# Patient Record
Sex: FEMALE | Race: WHITE | URBAN - METROPOLITAN AREA
[De-identification: names, ages, dates, MRNs, and addresses within clinical notes are randomized per-mention and may not be internally consistent; named-entity substitution may affect disease eponyms.]

---

## 2020-07-14 ENCOUNTER — TELEPHONE ENCOUNTER (OUTPATIENT)
Dept: URBAN - METROPOLITAN AREA CLINIC 92 | Facility: CLINIC | Age: 50
End: 2020-07-14

## 2020-07-14 RX ORDER — PREDNISONE 5 MG/1
AS DIRECTED TABLET ORAL AS DIRECTED
Qty: 1 | Refills: 1 | OUTPATIENT

## 2022-09-01 ENCOUNTER — TELEPHONE ENCOUNTER (OUTPATIENT)
Dept: URBAN - METROPOLITAN AREA CLINIC 98 | Facility: CLINIC | Age: 52
End: 2022-09-01

## 2022-09-01 RX ORDER — METHYLPREDNISOLONE 4 MG/1
AS DIRECTED TABLET ORAL ONCE A DAY
Qty: 21 TABLETS | Refills: 0 | OUTPATIENT
Start: 2022-09-01 | End: 2022-09-07

## 2022-09-07 ENCOUNTER — TELEPHONE ENCOUNTER (OUTPATIENT)
Dept: URBAN - METROPOLITAN AREA CLINIC 98 | Facility: CLINIC | Age: 52
End: 2022-09-07

## 2022-09-07 ENCOUNTER — CLAIMS CREATED FROM THE CLAIM WINDOW (OUTPATIENT)
Dept: URBAN - METROPOLITAN AREA CLINIC 98 | Facility: CLINIC | Age: 52
End: 2022-09-07
Payer: COMMERCIAL

## 2022-09-07 ENCOUNTER — WEB ENCOUNTER (OUTPATIENT)
Dept: URBAN - METROPOLITAN AREA CLINIC 98 | Facility: CLINIC | Age: 52
End: 2022-09-07

## 2022-09-07 VITALS
WEIGHT: 127 LBS | HEIGHT: 64 IN | DIASTOLIC BLOOD PRESSURE: 83 MMHG | TEMPERATURE: 97.1 F | SYSTOLIC BLOOD PRESSURE: 122 MMHG | BODY MASS INDEX: 21.68 KG/M2 | HEART RATE: 94 BPM

## 2022-09-07 DIAGNOSIS — R15.2 FECAL URGENCY: ICD-10-CM

## 2022-09-07 DIAGNOSIS — K51.80 CHRONIC PANCOLONIC ULCERATIVE COLITIS: ICD-10-CM

## 2022-09-07 DIAGNOSIS — C50.911 MALIGNANT NEOPLASM OF RIGHT FEMALE BREAST, UNSPECIFIED ESTROGEN RECEPTOR STATUS, UNSPECIFIED SITE OF BREAST: ICD-10-CM

## 2022-09-07 DIAGNOSIS — C50.919 BREAST CANCER: ICD-10-CM

## 2022-09-07 DIAGNOSIS — K51.90 ULCERATIVE COLITIS: ICD-10-CM

## 2022-09-07 DIAGNOSIS — K62.5 RECTAL BLEEDING: ICD-10-CM

## 2022-09-07 DIAGNOSIS — K51.00 ULCERATIVE COLITIS: ICD-10-CM

## 2022-09-07 PROBLEM — 372064008: Status: ACTIVE | Noted: 2022-09-07

## 2022-09-07 PROCEDURE — 99215 OFFICE O/P EST HI 40 MIN: CPT | Performed by: INTERNAL MEDICINE

## 2022-09-07 RX ORDER — MESALAMINE 4 G/60ML
AS DIRECTED SUSPENSION RECTAL
Qty: 90 EACH | Refills: 3 | OUTPATIENT
Start: 2022-09-07 | End: 2023-09-02

## 2022-09-07 RX ORDER — LEVOTHYROXINE SODIUM 75 UG/1
TAKE 1 TABLET (75 MCG) BY ORAL ROUTE ONCE DAILY TABLET ORAL 1
Qty: 0 | Refills: 0 | Status: ACTIVE | COMMUNITY
Start: 1900-01-01

## 2022-09-07 NOTE — HPI-TODAY'S VISIT:
52 yo female with UC dx 1989 or so comes in for near 3 year f/u. Last ov was 10/23/19. 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  Was in remission at colonoscopy 2020. Diet sensitive and responsive.  Never been on biologic  Never on IMM.  Dr. Montaño took care of her for 20 years after Dr. Hill.  Syx worsened December 2022 and she feels it is beer and food related. Did the whole - elimination diet. No gluten No alcohol. Similar to fodmap.  Recent medrol dose pack because syx worst past month or so.  Stools 2-3-4 stools a day. May pass blood once or twice a day.  In June, she did the whole 30 and things got better. Today, the medrol did not help.  Rowasa enemas work/help. No recent Rowasa- 1 week and taper usually helps.

## 2022-09-10 LAB
A/G RATIO: 1.7
ALBUMIN: 4.7
ALKALINE PHOSPHATASE: 78
ALT (SGPT): 15
AST (SGOT): 15
BASO (ABSOLUTE): 0.1
BASOS: 1
BILIRUBIN, TOTAL: 0.3
BUN/CREATININE RATIO: 20
BUN: 16
C-REACTIVE PROTEIN, QUANT: <1
CALCIUM: 9.6
CARBON DIOXIDE, TOTAL: 22
CHLORIDE: 101
CREATININE: 0.82
EGFR: 87
EOS (ABSOLUTE): 0.1
EOS: 1
FERRITIN, SERUM: 19
FOLATE (FOLIC ACID), SERUM: 14.9
GLOBULIN, TOTAL: 2.7
GLUCOSE: 82
HBSAG SCREEN: NEGATIVE
HEMATOCRIT: 44.3
HEMATOLOGY COMMENTS:: (no result)
HEMOGLOBIN: 13.8
HEP B SURFACE AB, QUAL: NON REACTIVE
IMMATURE CELLS: (no result)
IMMATURE GRANS (ABS): 0.1
IMMATURE GRANULOCYTES: 1
IRON BIND.CAP.(TIBC): 376
IRON SATURATION: 22
IRON: 84
LYMPHS (ABSOLUTE): 2.6
LYMPHS: 27
MCH: 26.7
MCHC: 31.2
MCV: 86
MONOCYTES(ABSOLUTE): 0.6
MONOCYTES: 6
NEUTROPHILS (ABSOLUTE): 6.3
NEUTROPHILS: 64
NRBC: (no result)
PLATELETS: 381
POTASSIUM: 4.6
PROTEIN, TOTAL: 7.4
QUANTIFERON CRITERIA: (no result)
QUANTIFERON INCUBATION: (no result)
QUANTIFERON MITOGEN VALUE: 8.45
QUANTIFERON NIL VALUE: 0.02
QUANTIFERON TB1 AG VALUE: 0.01
QUANTIFERON TB2 AG VALUE: 0.02
QUANTIFERON-TB GOLD PLUS: NEGATIVE
RBC: 5.17
RDW: 13.2
SEDIMENTATION RATE-WESTERGREN: 13
SODIUM: 140
UIBC: 292
VITAMIN B12: 688
VITAMIN D, 25-HYDROXY: 33.9
WBC: 9.8

## 2022-09-12 PROBLEM — 698065002 ACID REFLUX: Status: ACTIVE | Noted: 2022-09-12

## 2022-09-12 PROBLEM — 64766004 ULCERATIVE COLITIS: Status: ACTIVE | Noted: 2022-09-07

## 2022-11-10 ENCOUNTER — WEB ENCOUNTER (OUTPATIENT)
Dept: URBAN - METROPOLITAN AREA SURGERY CENTER 18 | Facility: SURGERY CENTER | Age: 52
End: 2022-11-10

## 2022-11-15 ENCOUNTER — CLAIMS CREATED FROM THE CLAIM WINDOW (OUTPATIENT)
Dept: URBAN - METROPOLITAN AREA SURGERY CENTER 18 | Facility: SURGERY CENTER | Age: 52
End: 2022-11-15
Payer: COMMERCIAL

## 2022-11-15 ENCOUNTER — CLAIMS CREATED FROM THE CLAIM WINDOW (OUTPATIENT)
Dept: URBAN - METROPOLITAN AREA CLINIC 4 | Facility: CLINIC | Age: 52
End: 2022-11-15
Payer: COMMERCIAL

## 2022-11-15 ENCOUNTER — CLAIMS CREATED FROM THE CLAIM WINDOW (OUTPATIENT)
Dept: URBAN - METROPOLITAN AREA SURGERY CENTER 18 | Facility: SURGERY CENTER | Age: 52
End: 2022-11-15

## 2022-11-15 DIAGNOSIS — K21.9 ACID REFLUX: ICD-10-CM

## 2022-11-15 DIAGNOSIS — K31.89 OTHER DISEASES OF STOMACH AND DUODENUM: ICD-10-CM

## 2022-11-15 DIAGNOSIS — K51.00 ACUTE ULCERATIVE PANCOLITIS: ICD-10-CM

## 2022-11-15 PROCEDURE — G8907 PT DOC NO EVENTS ON DISCHARG: HCPCS | Performed by: INTERNAL MEDICINE

## 2022-11-15 PROCEDURE — 88305 TISSUE EXAM BY PATHOLOGIST: CPT | Performed by: PATHOLOGY

## 2022-11-15 PROCEDURE — 45380 COLONOSCOPY AND BIOPSY: CPT | Performed by: INTERNAL MEDICINE

## 2022-11-15 PROCEDURE — 43239 EGD BIOPSY SINGLE/MULTIPLE: CPT | Performed by: INTERNAL MEDICINE

## 2022-11-15 RX ORDER — MESALAMINE 4 G/60ML
AS DIRECTED SUSPENSION RECTAL
Qty: 90 EACH | Refills: 3 | Status: ACTIVE | COMMUNITY
Start: 2022-09-07 | End: 2023-09-02

## 2022-11-15 RX ORDER — LEVOTHYROXINE SODIUM 75 UG/1
TAKE 1 TABLET (75 MCG) BY ORAL ROUTE ONCE DAILY TABLET ORAL 1
Qty: 0 | Refills: 0 | Status: ACTIVE | COMMUNITY
Start: 1900-01-01

## 2023-08-16 ENCOUNTER — OFFICE VISIT (OUTPATIENT)
Dept: URBAN - METROPOLITAN AREA CLINIC 98 | Facility: CLINIC | Age: 53
End: 2023-08-16
Payer: COMMERCIAL

## 2023-08-16 ENCOUNTER — TELEPHONE ENCOUNTER (OUTPATIENT)
Dept: URBAN - METROPOLITAN AREA CLINIC 98 | Facility: CLINIC | Age: 53
End: 2023-08-16

## 2023-08-16 ENCOUNTER — DASHBOARD ENCOUNTERS (OUTPATIENT)
Age: 53
End: 2023-08-16

## 2023-08-16 VITALS
BODY MASS INDEX: 21.31 KG/M2 | HEIGHT: 64 IN | HEART RATE: 85 BPM | SYSTOLIC BLOOD PRESSURE: 127 MMHG | TEMPERATURE: 97.9 F | DIASTOLIC BLOOD PRESSURE: 70 MMHG | WEIGHT: 124.8 LBS

## 2023-08-16 DIAGNOSIS — K62.5 RECTAL BLEEDING: ICD-10-CM

## 2023-08-16 DIAGNOSIS — R19.7 DIARRHEA, UNSPECIFIED TYPE: ICD-10-CM

## 2023-08-16 DIAGNOSIS — C50.911 MALIGNANT NEOPLASM OF RIGHT FEMALE BREAST, UNSPECIFIED ESTROGEN RECEPTOR STATUS, UNSPECIFIED SITE OF BREAST: ICD-10-CM

## 2023-08-16 DIAGNOSIS — K51.90 ULCERATIVE COLITIS: ICD-10-CM

## 2023-08-16 DIAGNOSIS — C50.919 BREAST CANCER: ICD-10-CM

## 2023-08-16 DIAGNOSIS — K21.9 ACID REFLUX: ICD-10-CM

## 2023-08-16 PROCEDURE — 99215 OFFICE O/P EST HI 40 MIN: CPT | Performed by: INTERNAL MEDICINE

## 2023-08-16 RX ORDER — LEVOTHYROXINE SODIUM 75 UG/1
TAKE 1 TABLET (75 MCG) BY ORAL ROUTE ONCE DAILY TABLET ORAL 1
Qty: 0 | Refills: 0 | Status: ACTIVE | COMMUNITY
Start: 1900-01-01

## 2023-08-16 RX ORDER — PREDNISONE 5 MG/1
TAKE 4 TABS DAILY WEEK 1, 3 TABS DAILY WEEK 2, 2 TABS DAILY WEEK 3, THEN 1 TAB DAILY WEEK 4, THEN STOP TABLET ORAL ONCE A DAY
Qty: 70 TABLETS | Refills: 1 | OUTPATIENT
Start: 2023-08-16 | End: 2023-10-15

## 2023-08-16 RX ORDER — PREDNISONE 5 MG/1
TAKE 4 TABS DAILY WEEK 1, 3 TABS DAILY WEEK 2, 2 TABS DAILY WEEK 3, THEN 1 TAB DAILY WEEK 4, THEN STOP TABLET ORAL ONCE A DAY
Qty: 70 TABLETS | Refills: 1
Start: 2023-08-16 | End: 2023-10-15

## 2023-08-16 RX ORDER — MESALAMINE 4 G/60ML
AS DIRECTED SUSPENSION RECTAL
Qty: 90 EACH | Refills: 3 | Status: ACTIVE | COMMUNITY
Start: 2022-09-07 | End: 2023-09-02

## 2023-08-16 RX ORDER — MESALAMINE 1.2 G/1
4 TABLETS WITH A MEAL TABLET, DELAYED RELEASE ORAL ONCE A DAY
Qty: 360 TABLET | Refills: 3 | OUTPATIENT
Start: 2023-08-16 | End: 2024-08-10

## 2023-08-16 RX ORDER — TAMOXIFEN CITRATE 10 MG/1
1 TABLET TABLET ORAL TWICE A DAY
Status: ACTIVE | COMMUNITY

## 2023-08-16 RX ORDER — MESALAMINE 1.2 G/1
4 TABLETS WITH A MEAL TABLET, DELAYED RELEASE ORAL ONCE A DAY
Qty: 360 TABLET | Refills: 3
Start: 2023-08-16 | End: 2024-08-10

## 2023-08-16 NOTE — HPI-TODAY'S VISIT:
51 yo female with UC comes in for 1 year office follow. Syx have been ongoing since colonoscopy.  has had 2 knee replacements, so she has been busy  If she follows a fodmap diet, it helps. chocolate and sugar and beer are triggers. ///o breast cancer 1/18/17.  No pain. Urgency Range of stooling per day is just mucus - once a day. Blood every 2-3 days. When she takes Rowasa enema. Has not use  Never been on Lialda.  Never on a biologic. Had double mastectomy 2012, was on tamoxifen, Dr. Troy Beauchamp.  Also has terrible arthritis.Internitst was Dr. Hayde love now, needs a doc.  No sulfa allergy. She would liike to try steroids.    ======================== 9/7/22  50 yo female with UC dx 1989 or so comes in for near 3 year f/u. Last ov was 10/23/19. 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  Was in remission at colonoscopy 2020. Diet sensitive and responsive.  Never been on biologic  Never on IMM.  Dr. Montaño took care of her for 20 years after Dr. Hill.  Syx worsened December 2022 and she feels it is beer and food related. Did the whole - elimination diet. No gluten No alcohol. Similar to fodmap.  Recent medrol dose pack because syx worst past month or so.  Stools 2-3-4 stools a day. May pass blood once or twice a day.  In June, she did the whole 30 and things got better. Today, the medrol did not help.  Rowasa enemas work/help. No recent Rowasa- 1 week and taper usually helps.

## 2023-08-17 LAB
A/G RATIO: 1.6
ALBUMIN: 4.6
ALKALINE PHOSPHATASE: 74
ALT (SGPT): 12
AST (SGOT): 20
BASO (ABSOLUTE): 0.1
BASOS: 1
BILIRUBIN, TOTAL: 0.3
BUN/CREATININE RATIO: 22
BUN: 17
C-REACTIVE PROTEIN, QUANT: <1
CALCIUM: 10
CARBON DIOXIDE, TOTAL: 26
CHLORIDE: 101
CREATININE: 0.79
EGFR: 90
EOS (ABSOLUTE): 0.1
EOS: 1
FERRITIN, SERUM: 32
FOLATE (FOLIC ACID), SERUM: 17.4
GLOBULIN, TOTAL: 2.9
GLUCOSE: 90
HEMATOCRIT: 40.9
HEMATOLOGY COMMENTS:: (no result)
HEMOGLOBIN: 13.3
IMMATURE CELLS: (no result)
IMMATURE GRANS (ABS): 0
IMMATURE GRANULOCYTES: 0
IRON BIND.CAP.(TIBC): 357
IRON SATURATION: 15
IRON: 53
LYMPHS (ABSOLUTE): 2.9
LYMPHS: 33
MCH: 28.4
MCHC: 32.5
MCV: 87
MONOCYTES(ABSOLUTE): 0.5
MONOCYTES: 6
NEUTROPHILS (ABSOLUTE): 5.3
NEUTROPHILS: 59
NRBC: (no result)
PLATELETS: 335
POTASSIUM: 4.5
PROTEIN, TOTAL: 7.5
RBC: 4.68
RDW: 13.2
SEDIMENTATION RATE-WESTERGREN: 10
SODIUM: 140
UIBC: 304
VITAMIN B12: 642
VITAMIN D, 25-HYDROXY: 42.7
WBC: 8.9

## 2023-08-18 ENCOUNTER — TELEPHONE ENCOUNTER (OUTPATIENT)
Dept: URBAN - METROPOLITAN AREA CLINIC 98 | Facility: CLINIC | Age: 53
End: 2023-08-18

## 2023-08-18 RX ORDER — LEVOTHYROXINE SODIUM 75 UG/1
TAKE 1 TABLET (75 MCG) BY ORAL ROUTE ONCE DAILY TABLET ORAL 1
Qty: 0 | Refills: 0 | Status: ACTIVE | COMMUNITY
Start: 1900-01-01

## 2023-08-18 RX ORDER — MESALAMINE 375 MG/1
4 CAPSULES IN THE MORNING CAPSULE, EXTENDED RELEASE ORAL ONCE A DAY
Qty: 360 CAPSULE | Refills: 3 | OUTPATIENT
Start: 2023-08-18 | End: 2024-08-12

## 2023-08-18 RX ORDER — TAMOXIFEN CITRATE 10 MG/1
1 TABLET TABLET ORAL TWICE A DAY
Status: ACTIVE | COMMUNITY

## 2023-08-18 RX ORDER — MESALAMINE 375 MG/1
4 CAPSULES IN THE MORNING CAPSULE, EXTENDED RELEASE ORAL ONCE A DAY
Qty: 360 CAPSULE | Refills: 3 | Status: ACTIVE | COMMUNITY
Start: 2023-08-18 | End: 2024-08-12

## 2023-08-18 RX ORDER — MESALAMINE 4 G/60ML
AS DIRECTED SUSPENSION RECTAL
Qty: 90 EACH | Refills: 3 | Status: ACTIVE | COMMUNITY
Start: 2022-09-07 | End: 2023-09-02

## 2023-08-18 RX ORDER — MESALAMINE 1.2 G/1
4 TABLETS WITH A MEAL TABLET, DELAYED RELEASE ORAL ONCE A DAY
Qty: 360 TABLET | Refills: 3 | Status: DISCONTINUED | COMMUNITY
Start: 2023-08-16 | End: 2024-08-10

## 2023-08-18 RX ORDER — PREDNISONE 5 MG/1
TAKE 4 TABS DAILY WEEK 1, 3 TABS DAILY WEEK 2, 2 TABS DAILY WEEK 3, THEN 1 TAB DAILY WEEK 4, THEN STOP TABLET ORAL ONCE A DAY
Qty: 70 TABLETS | Refills: 1 | Status: ACTIVE | COMMUNITY
Start: 2023-08-16 | End: 2023-10-15

## 2023-09-06 ENCOUNTER — TELEPHONE ENCOUNTER (OUTPATIENT)
Dept: URBAN - METROPOLITAN AREA CLINIC 98 | Facility: CLINIC | Age: 53
End: 2023-09-06

## 2023-09-06 RX ORDER — PREDNISONE 10 MG/1
4 TABLETS TABLET ORAL ONCE A DAY
Qty: 120 TABLET | Refills: 0 | OUTPATIENT
Start: 2023-09-06 | End: 2023-10-06

## 2024-12-27 ENCOUNTER — WEB ENCOUNTER (OUTPATIENT)
Dept: URBAN - METROPOLITAN AREA CLINIC 98 | Facility: CLINIC | Age: 54
End: 2024-12-27

## 2024-12-30 ENCOUNTER — TELEPHONE ENCOUNTER (OUTPATIENT)
Dept: URBAN - METROPOLITAN AREA CLINIC 98 | Facility: CLINIC | Age: 54
End: 2024-12-30

## 2024-12-30 RX ORDER — MESALAMINE 1.2 G/1
4 TABLETS WITH MEAL TABLET, DELAYED RELEASE ORAL ONCE A DAY
Qty: 360 TABLET | Refills: 3
Start: 2024-12-30 | End: 2025-12-25

## 2024-12-30 RX ORDER — MESALAMINE 1.2 G/1
4 TABLETS WITH MEAL TABLET, DELAYED RELEASE ORAL ONCE A DAY
Qty: 360 TABLET | Refills: 3 | OUTPATIENT
Start: 2024-12-30 | End: 2025-12-25

## 2024-12-30 RX ORDER — TAMOXIFEN CITRATE 10 MG/1
1 TABLET TABLET ORAL TWICE A DAY
Status: DISCONTINUED | COMMUNITY

## 2024-12-30 RX ORDER — MESALAMINE 1.2 G/1
4 TABLETS WITH MEAL TABLET, DELAYED RELEASE ORAL ONCE A DAY
Qty: 360 TABLET | Refills: 3 | Status: ACTIVE | COMMUNITY
Start: 2024-12-30 | End: 2025-12-25

## 2024-12-30 RX ORDER — LEVOTHYROXINE SODIUM 75 UG/1
TAKE 1 TABLET (75 MCG) BY ORAL ROUTE ONCE DAILY TABLET ORAL 1
Qty: 0 | Refills: 0 | Status: ACTIVE | COMMUNITY
Start: 1900-01-01

## 2025-01-01 ENCOUNTER — WEB ENCOUNTER (OUTPATIENT)
Dept: URBAN - METROPOLITAN AREA CLINIC 98 | Facility: CLINIC | Age: 55
End: 2025-01-01

## 2025-01-02 ENCOUNTER — TELEPHONE ENCOUNTER (OUTPATIENT)
Dept: URBAN - METROPOLITAN AREA CLINIC 98 | Facility: CLINIC | Age: 55
End: 2025-01-02

## 2025-01-02 RX ORDER — MESALAMINE 375 MG/1
4 CAPSULES IN THE MORNING CAPSULE, EXTENDED RELEASE ORAL ONCE A DAY
Qty: 360 CAPSULE | Refills: 3 | OUTPATIENT
Start: 2025-01-02 | End: 2025-12-28

## 2025-01-02 RX ORDER — MESALAMINE 1.2 G/1
4 TABLETS WITH MEAL TABLET, DELAYED RELEASE ORAL ONCE A DAY
Qty: 360 TABLET | Refills: 3 | Status: DISCONTINUED | COMMUNITY
Start: 2024-12-30 | End: 2025-12-25

## 2025-01-02 RX ORDER — LEVOTHYROXINE SODIUM 75 UG/1
TAKE 1 TABLET (75 MCG) BY ORAL ROUTE ONCE DAILY TABLET ORAL 1
Qty: 0 | Refills: 0 | Status: ACTIVE | COMMUNITY
Start: 1900-01-01

## 2025-01-02 RX ORDER — MESALAMINE 375 MG/1
4 CAPSULES IN THE MORNING CAPSULE, EXTENDED RELEASE ORAL ONCE A DAY
Qty: 360 CAPSULE | Refills: 3
Start: 2025-01-02 | End: 2025-12-28

## 2025-02-03 ENCOUNTER — LAB OUTSIDE AN ENCOUNTER (OUTPATIENT)
Dept: URBAN - METROPOLITAN AREA CLINIC 98 | Facility: CLINIC | Age: 55
End: 2025-02-03

## 2025-02-03 ENCOUNTER — OFFICE VISIT (OUTPATIENT)
Dept: URBAN - METROPOLITAN AREA CLINIC 98 | Facility: CLINIC | Age: 55
End: 2025-02-03
Payer: SELF-PAY

## 2025-02-03 VITALS
HEART RATE: 82 BPM | DIASTOLIC BLOOD PRESSURE: 74 MMHG | WEIGHT: 130.8 LBS | BODY MASS INDEX: 22.33 KG/M2 | TEMPERATURE: 96.8 F | HEIGHT: 64 IN | SYSTOLIC BLOOD PRESSURE: 117 MMHG

## 2025-02-03 DIAGNOSIS — C50.919 BREAST CANCER: ICD-10-CM

## 2025-02-03 DIAGNOSIS — K62.5 RECTAL BLEEDING: ICD-10-CM

## 2025-02-03 DIAGNOSIS — K21.9 ACID REFLUX: ICD-10-CM

## 2025-02-03 DIAGNOSIS — C50.911 MALIGNANT NEOPLASM OF RIGHT FEMALE BREAST, UNSPECIFIED ESTROGEN RECEPTOR STATUS, UNSPECIFIED SITE OF BREAST: ICD-10-CM

## 2025-02-03 DIAGNOSIS — K51.90 ULCERATIVE COLITIS: ICD-10-CM

## 2025-02-03 PROCEDURE — 99215 OFFICE O/P EST HI 40 MIN: CPT | Performed by: INTERNAL MEDICINE

## 2025-02-03 RX ORDER — LEVOTHYROXINE SODIUM 75 UG/1
TAKE 1 TABLET (75 MCG) BY ORAL ROUTE ONCE DAILY TABLET ORAL 1
Qty: 0 | Refills: 0 | Status: ACTIVE | COMMUNITY
Start: 1900-01-01

## 2025-02-03 RX ORDER — MESALAMINE 375 MG/1
4 CAPSULES IN THE MORNING CAPSULE, EXTENDED RELEASE ORAL ONCE A DAY
Qty: 360 CAPSULE | Refills: 3 | Status: ACTIVE | COMMUNITY
Start: 2025-01-02 | End: 2025-12-28

## 2025-02-03 NOTE — PHYSICAL EXAM RECTAL:
normal tone, no external hemorrhoids, no masses palpable, no red blood, Tenderness on IRMA, Internal hemorrhoids present
Statement Selected

## 2025-02-03 NOTE — HPI-TODAY'S VISIT:
53 yo female with mild UC on mesalamine 375 4 per day for a year or s. No meds for UC befoere that. She thinks it has helped. Her nl is Mild UC syx. STeroids, things are great.  Joints were an issue, last year, she could hardly move her hands which were stiff, took "old" steroids, for an ear infection, and a 2 week course did the trick.  No joint syx now.  Infreqeunt syx, urgency with coffee Occasional blood, 1-5 times per month.l  No other med problemsn Uniltateral breast ca and bilat mastectomy  Never had a flu shot and not changing now. No covid. No primary care.  Gave name.    ============================= 8/16/23  51 yo female with UC comes in for 1 year office follow. Syx have been ongoing since colonoscopy.  has had 2 knee replacements, so she has been busy  If she follows a fodmap diet, it helps. chocolate and sugar and beer are triggers. ///o breast cancer 1/18/17.  No pain. Urgency Range of stooling per day is just mucus - once a day. Blood every 2-3 days. When she takes Rowasa enema. Has not use  Never been on Lialda.  Never on a biologic. Had double mastectomy 2012, was on tamoxifen, Dr. Troy Beauchamp.  Also has terrible arthritis.Internitst was Dr. Hayde love now, needs a doc.  No sulfa allergy. She would liike to try steroids.    ======================== 9/7/22  52 yo female with UC dx 1989 or so comes in for near 3 year f/u. Last ov was 10/23/19. 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  Was in remission at colonoscopy 2020. Diet sensitive and responsive.  Never been on biologic Never on IMM.  Dr. Montaño took care of her for 20 years after Dr. Hill.  Syx worsened December 2022 and she feels it is beer and food related. Did the whole - elimination diet. No gluten No alcohol. Similar to fodmap.  Recent medrol dose pack because syx worst past month or so.  Stools 2-3-4 stools a day. May pass blood once or twice a day.  In June, she did the whole 30 and things got better. Today, the medrol did not help.  Rowasa enemas work/help. No recent Rowasa- 1 week and taper usually helps.

## 2025-02-04 LAB
ALBUMIN: 4.5
ALKALINE PHOSPHATASE: 92
ALT (SGPT): 11
AST (SGOT): 21
BASO (ABSOLUTE): 0.1
BASOS: 1
BILIRUBIN, TOTAL: 0.2
BUN/CREATININE RATIO: 21
BUN: 22
C-REACTIVE PROTEIN, QUANT: 1
CALCIUM: 10
CARBON DIOXIDE, TOTAL: 24
CHLORIDE: 100
CREATININE: 1.03
EGFR: 65
EOS (ABSOLUTE): 0.1
EOS: 2
FERRITIN: 40
FOLATE (FOLIC ACID), SERUM: 16.9
GLOBULIN, TOTAL: 2.9
GLUCOSE: 93
HEMATOCRIT: 43.9
HEMATOLOGY COMMENTS:: (no result)
HEMOGLOBIN: 13.9
IMMATURE CELLS: (no result)
IMMATURE GRANS (ABS): 0
IMMATURE GRANULOCYTES: 0
IRON BIND.CAP.(TIBC): 307
IRON SATURATION: 23
IRON: 70
LYMPHS (ABSOLUTE): 2.6
LYMPHS: 31
MCH: 27.8
MCHC: 31.7
MCV: 88
MONOCYTES(ABSOLUTE): 0.5
MONOCYTES: 6
NEUTROPHILS (ABSOLUTE): 5.1
NEUTROPHILS: 60
NRBC: (no result)
PLATELETS: 346
POTASSIUM: 4.7
PROTEIN, TOTAL: 7.4
RBC: 5
RDW: 12.8
RETICULOCYTE COUNT: 1.2
SEDIMENTATION RATE-WESTERGREN: 12
SODIUM: 136
UIBC: 237
VITAMIN B12: 642
VITAMIN D, 25-HYDROXY: 72.5
WBC: 8.4

## 2025-02-13 ENCOUNTER — WEB ENCOUNTER (OUTPATIENT)
Dept: URBAN - METROPOLITAN AREA CLINIC 98 | Facility: CLINIC | Age: 55
End: 2025-02-13

## 2025-02-14 LAB
CALPROTECTIN, FECAL: 435
LACTOFERRIN, FECAL, QUANT.: 124.05

## 2025-02-19 ENCOUNTER — WEB ENCOUNTER (OUTPATIENT)
Dept: URBAN - METROPOLITAN AREA CLINIC 98 | Facility: CLINIC | Age: 55
End: 2025-02-19

## 2025-02-20 ENCOUNTER — OFFICE VISIT (OUTPATIENT)
Dept: URBAN - METROPOLITAN AREA SURGERY CENTER 18 | Facility: SURGERY CENTER | Age: 55
End: 2025-02-20
Payer: SELF-PAY

## 2025-02-20 ENCOUNTER — CLAIMS CREATED FROM THE CLAIM WINDOW (OUTPATIENT)
Dept: URBAN - METROPOLITAN AREA CLINIC 4 | Facility: CLINIC | Age: 55
End: 2025-02-20
Payer: SELF-PAY

## 2025-02-20 DIAGNOSIS — K63.89 OTHER SPECIFIED DISEASES OF INTESTINE: ICD-10-CM

## 2025-02-20 DIAGNOSIS — K51.919 CHRONIC ULCERATIVE COLITIS: ICD-10-CM

## 2025-02-20 DIAGNOSIS — K51.80 ULCERATIVE COLITIS: ICD-10-CM

## 2025-02-20 DIAGNOSIS — K51.30 ACUTE ULCERATIVE RECTOSIGMOIDITIS: ICD-10-CM

## 2025-02-20 PROCEDURE — 45380 COLONOSCOPY AND BIOPSY: CPT | Performed by: INTERNAL MEDICINE

## 2025-02-20 PROCEDURE — 88305 TISSUE EXAM BY PATHOLOGIST: CPT | Performed by: PATHOLOGY

## 2025-02-20 PROCEDURE — 00811 ANES LWR INTST NDSC NOS: CPT | Performed by: NURSE ANESTHETIST, CERTIFIED REGISTERED

## 2025-02-20 RX ORDER — LEVOTHYROXINE SODIUM 75 UG/1
TAKE 1 TABLET (75 MCG) BY ORAL ROUTE ONCE DAILY TABLET ORAL 1
Qty: 0 | Refills: 0 | Status: ACTIVE | COMMUNITY
Start: 1900-01-01

## 2025-02-20 RX ORDER — MESALAMINE 375 MG/1
4 CAPSULES IN THE MORNING CAPSULE, EXTENDED RELEASE ORAL ONCE A DAY
Qty: 360 CAPSULE | Refills: 3 | Status: ACTIVE | COMMUNITY
Start: 2025-01-02 | End: 2025-12-28

## 2025-02-21 ENCOUNTER — WEB ENCOUNTER (OUTPATIENT)
Dept: URBAN - METROPOLITAN AREA CLINIC 98 | Facility: CLINIC | Age: 55
End: 2025-02-21

## 2025-02-21 RX ORDER — MESALAMINE 1000 MG/1
1 SUPPOSITORY AT BEDTIME SUPPOSITORY RECTAL ONCE A DAY
Qty: 30 SUPPOSITORIES | Refills: 11 | OUTPATIENT
Start: 2025-02-24 | End: 2026-02-19

## 2025-02-21 RX ORDER — MESALAMINE 4 G/60ML
INSERT 4GM SUSPENSION RECTAL ONCE DAILY
Qty: 30 EACH | Refills: 11 | OUTPATIENT
Start: 2025-02-24 | End: 2026-02-19